# Patient Record
Sex: FEMALE | Race: BLACK OR AFRICAN AMERICAN | NOT HISPANIC OR LATINO | URBAN - METROPOLITAN AREA
[De-identification: names, ages, dates, MRNs, and addresses within clinical notes are randomized per-mention and may not be internally consistent; named-entity substitution may affect disease eponyms.]

---

## 2023-05-12 ENCOUNTER — OFFICE (OUTPATIENT)
Dept: URBAN - METROPOLITAN AREA CLINIC 12 | Facility: CLINIC | Age: 43
End: 2023-05-12

## 2023-05-12 VITALS
HEART RATE: 70 BPM | DIASTOLIC BLOOD PRESSURE: 79 MMHG | SYSTOLIC BLOOD PRESSURE: 128 MMHG | OXYGEN SATURATION: 97 % | HEIGHT: 64 IN | WEIGHT: 196 LBS

## 2023-05-12 DIAGNOSIS — K46.9 UNSPECIFIED ABDOMINAL HERNIA WITHOUT OBSTRUCTION OR GANGRENE: ICD-10-CM

## 2023-05-12 DIAGNOSIS — R10.9 UNSPECIFIED ABDOMINAL PAIN: ICD-10-CM

## 2023-05-12 DIAGNOSIS — K59.00 CONSTIPATION, UNSPECIFIED: ICD-10-CM

## 2023-05-12 DIAGNOSIS — R12 HEARTBURN: ICD-10-CM

## 2023-05-12 PROCEDURE — 99204 OFFICE O/P NEW MOD 45 MIN: CPT | Performed by: NURSE PRACTITIONER

## 2023-05-12 RX ORDER — LINACLOTIDE 145 UG/1
CAPSULE, GELATIN COATED ORAL
Qty: 90 | Refills: 3 | Status: ACTIVE
Start: 2023-05-12

## 2023-05-12 NOTE — SERVICENOTES
she has had some ongoing abdominal pain likely from a large abdominal/ventral hernia.   Will refer to Dr. Cruz.  We will trial on Linzess for her constipation.   Will increase dose if needed.   She will use OTC PPI or anti acid reducing medications for occasional heartburn.

## 2023-05-12 NOTE — SERVICEHPINOTES
Ms. Ritchie is a 42 year old female here today with complaints of  abdominal pain, heartburn, constipation. For the past 2 years, she has had constipation, bloating,  abdominal pain above her navel almost daily that is relieved when she has a bowel movement.  She has a bowel movement once a week when she takes a laxative. She has taken stool softeners daily in the past but they did not help. She has a bulge in her upper abdomen that has gotten worse over the past 2 years and gets larger when she is constipated.  She has occasional heartburn with certain foods but does not take any medication.  Her symptoms resolved fairly quickly.   She denies any nausea, vomiting, diarrhea, blood in stool.

## 2023-05-25 ENCOUNTER — OFFICE (OUTPATIENT)
Dept: URBAN - METROPOLITAN AREA CLINIC 19 | Facility: CLINIC | Age: 43
End: 2023-05-25

## 2023-05-25 DIAGNOSIS — K59.00 CONSTIPATION, UNSPECIFIED: ICD-10-CM

## 2023-05-25 DIAGNOSIS — R12 HEARTBURN: ICD-10-CM

## 2023-05-25 DIAGNOSIS — R10.9 UNSPECIFIED ABDOMINAL PAIN: ICD-10-CM

## 2023-05-25 DIAGNOSIS — K46.9 UNSPECIFIED ABDOMINAL HERNIA WITHOUT OBSTRUCTION OR GANGRENE: ICD-10-CM

## 2023-05-25 PROCEDURE — 76700 US EXAM ABDOM COMPLETE: CPT | Mod: TC | Performed by: NURSE PRACTITIONER

## 2023-07-21 ENCOUNTER — OFFICE (OUTPATIENT)
Dept: URBAN - METROPOLITAN AREA CLINIC 12 | Facility: CLINIC | Age: 43
End: 2023-07-21